# Patient Record
Sex: FEMALE | Race: WHITE | NOT HISPANIC OR LATINO | URBAN - METROPOLITAN AREA
[De-identification: names, ages, dates, MRNs, and addresses within clinical notes are randomized per-mention and may not be internally consistent; named-entity substitution may affect disease eponyms.]

---

## 2020-11-03 ENCOUNTER — OFFICE VISIT (OUTPATIENT)
Dept: URBAN - METROPOLITAN AREA CLINIC 33 | Facility: CLINIC | Age: 75
End: 2020-11-03

## 2020-11-03 VITALS
DIASTOLIC BLOOD PRESSURE: 70 MMHG | HEIGHT: 65 IN | BODY MASS INDEX: 23.49 KG/M2 | TEMPERATURE: 95.9 F | SYSTOLIC BLOOD PRESSURE: 110 MMHG | WEIGHT: 141 LBS

## 2020-11-03 PROBLEM — 428283002 HISTORY OF POLYP OF COLON (SITUATION): Status: ACTIVE | Noted: 2017-10-10

## 2020-11-03 RX ORDER — BACLOFEN 20 MG/1
1 TABLET TABLET ORAL
Status: ACTIVE | COMMUNITY

## 2020-11-03 RX ORDER — MULTIVITAMIN
1 TABLET TABLET ORAL ONCE A DAY
Qty: 30 | Status: ACTIVE | COMMUNITY

## 2020-11-03 RX ORDER — MEMANTINE HYDROCHLORIDE 10 MG/1
1 TABLET TABLET ORAL TWICE A DAY
Qty: 60 | Status: ON HOLD | COMMUNITY

## 2020-11-03 RX ORDER — PAROXETINE HYDROCHLORIDE 30 MG/1
1 TABLET IN THE MORNING TABLET, FILM COATED ORAL ONCE A DAY
Status: ACTIVE | COMMUNITY

## 2020-11-03 RX ORDER — CHOLECALCIFEROL (VITAMIN D3) 10(400)/ML
2 ML DROPS ORAL ONCE A DAY
Status: DISCONTINUED | COMMUNITY

## 2020-11-03 RX ORDER — ASPIRIN 81 MG/1
1 TABLET TABLET, CHEWABLE ORAL ONCE A DAY
COMMUNITY

## 2020-11-03 RX ORDER — KETOTIFEN FUMARATE 0.25 MG/ML
1 DROP INTO AFFECTED EYE SOLUTION OPHTHALMIC
Status: ACTIVE | COMMUNITY

## 2020-11-03 RX ORDER — DEXTROMETHORPHAN POLISTIREX 30 MG/5 ML
2 CAPSULES SUSPENSION, EXTENDED RELEASE 12 HR ORAL ONCE A DAY
Status: ACTIVE | COMMUNITY

## 2020-11-03 RX ORDER — ATORVASTATIN CALCIUM 20 MG/1
1 TABLET TABLET, FILM COATED ORAL ONCE A DAY
Status: ACTIVE | COMMUNITY

## 2020-11-03 RX ORDER — TIZANIDINE 4 MG/1
1 TABLET AS NEEDED TABLET ORAL THREE TIMES A DAY
COMMUNITY

## 2020-11-03 RX ORDER — UBIDECARENONE 60 MG
1 CAPSULE WITH A MEAL CAPSULE ORAL ONCE A DAY
Status: ON HOLD | COMMUNITY

## 2020-11-03 RX ORDER — CARBIDOPA AND LEVODOPA 25; 100 MG/1; MG/1
1 TABLET TABLET ORAL THREE TIMES A DAY
Status: ON HOLD | COMMUNITY

## 2020-11-03 RX ORDER — CLONAZEPAM 1 MG/1
1 TABLET TABLET ORAL ONCE A DAY
COMMUNITY

## 2020-11-03 RX ORDER — SODIUM SULFATE, POTASSIUM SULFATE, MAGNESIUM SULFATE 17.5; 3.13; 1.6 G/ML; G/ML; G/ML
AS DIRECTED SOLUTION, CONCENTRATE ORAL
Qty: 1 | Refills: 0 | OUTPATIENT
Start: 2020-11-03

## 2020-11-03 RX ORDER — AVENA SATIVA FLOWERING TOP, MATRICARIA RECUTITA, PASSIFLORA INCARNATA FLOWERING, TRIBASIC CALCIUM PHOSPHATE, DIBASIC POTASSIUM PHOSPHATE, POTASSIUM SULFATE, MAGNESIUM PHOSPHATE, DIBASIC, SODIUM CHLORIDE, SODIUM SULFATE, ZINC VALERATE DIHYDRATE, ARABICA COFFEE BEAN, STRYCHNOS NUX-VOMICA SEED, AND SULFUR 2; 2; 2; 2; 6; 6; 6; 6; 6; 6; 12; 12; 12 [HP_X]/1; [HP_X]/1; [HP_X]/1; [HP_X]/1; [HP_X]/1; [HP_X]/1; [HP_X]/1; [HP_X]/1; [HP_X]/1; [HP_X]/1; [HP_X]/1; [HP_X]/1; [HP_X]/1
TABLET ORAL
COMMUNITY

## 2020-11-03 NOTE — HPI-MIGRATED HPI
;     Surveillance Colonoscopy : Patient is a 75-year-old  female who presents today for a surveillance colonoscopy. Patient's last colonoscopy performed on 11/29/2017 with Dr. Awad revealed diverticulosis, hemorrhoids, hyperplastic polyp x1, tubular adenomas. Admits a fhx of rectal cancer. Patient denies a family hx of polyps and gastric/esophageal cancer/polyps. Patient currently admits 2 bowel movements a day as long as she takes Calm, her laxative. Stools are typically loose. Patient denies melena, blood, or mucus in stool, heartburn, nausea, vomiting, diarrhea, or constipation. ;

## 2021-01-21 ENCOUNTER — TELEPHONE ENCOUNTER (OUTPATIENT)
Dept: URBAN - METROPOLITAN AREA CLINIC 35 | Facility: CLINIC | Age: 76
End: 2021-01-21

## 2021-01-21 RX ORDER — SODIUM SULFATE, POTASSIUM SULFATE, MAGNESIUM SULFATE 17.5; 3.13; 1.6 G/ML; G/ML; G/ML
AS DIRECTED SOLUTION, CONCENTRATE ORAL
Qty: 1 | Refills: 0 | OUTPATIENT
Start: 2020-11-03

## 2021-01-29 ENCOUNTER — OFFICE VISIT (OUTPATIENT)
Dept: URBAN - METROPOLITAN AREA MEDICAL CENTER 10 | Facility: MEDICAL CENTER | Age: 76
End: 2021-01-29

## 2021-02-09 ENCOUNTER — OFFICE VISIT (OUTPATIENT)
Dept: URBAN - METROPOLITAN AREA CLINIC 33 | Facility: CLINIC | Age: 76
End: 2021-02-09

## 2021-02-09 RX ORDER — ATORVASTATIN CALCIUM 20 MG/1
1 TABLET TABLET, FILM COATED ORAL ONCE A DAY
Status: ACTIVE | COMMUNITY

## 2021-02-09 RX ORDER — TIZANIDINE 4 MG/1
1 TABLET AS NEEDED TABLET ORAL THREE TIMES A DAY
COMMUNITY

## 2021-02-09 RX ORDER — AVENA SATIVA FLOWERING TOP, MATRICARIA RECUTITA, PASSIFLORA INCARNATA FLOWERING, TRIBASIC CALCIUM PHOSPHATE, DIBASIC POTASSIUM PHOSPHATE, POTASSIUM SULFATE, MAGNESIUM PHOSPHATE, DIBASIC, SODIUM CHLORIDE, SODIUM SULFATE, ZINC VALERATE DIHYDRATE, ARABICA COFFEE BEAN, STRYCHNOS NUX-VOMICA SEED, AND SULFUR 2; 2; 2; 2; 6; 6; 6; 6; 6; 6; 12; 12; 12 [HP_X]/1; [HP_X]/1; [HP_X]/1; [HP_X]/1; [HP_X]/1; [HP_X]/1; [HP_X]/1; [HP_X]/1; [HP_X]/1; [HP_X]/1; [HP_X]/1; [HP_X]/1; [HP_X]/1
TABLET ORAL
COMMUNITY

## 2021-02-09 RX ORDER — BACLOFEN 20 MG/1
1 TABLET TABLET ORAL
Status: ACTIVE | COMMUNITY

## 2021-02-09 RX ORDER — DEXTROMETHORPHAN POLISTIREX 30 MG/5 ML
2 CAPSULES SUSPENSION, EXTENDED RELEASE 12 HR ORAL ONCE A DAY
Status: ACTIVE | COMMUNITY

## 2021-02-09 RX ORDER — PAROXETINE HYDROCHLORIDE 30 MG/1
1 TABLET IN THE MORNING TABLET, FILM COATED ORAL ONCE A DAY
Status: ACTIVE | COMMUNITY

## 2021-02-09 RX ORDER — MULTIVITAMIN
1 TABLET TABLET ORAL ONCE A DAY
Qty: 30 | Status: ACTIVE | COMMUNITY

## 2021-02-09 RX ORDER — CLONAZEPAM 1 MG/1
1 TABLET TABLET ORAL ONCE A DAY
COMMUNITY

## 2021-02-09 RX ORDER — CARBIDOPA AND LEVODOPA 25; 100 MG/1; MG/1
1 TABLET TABLET ORAL THREE TIMES A DAY
Status: ON HOLD | COMMUNITY

## 2021-02-09 RX ORDER — ASPIRIN 81 MG/1
1 TABLET TABLET, CHEWABLE ORAL ONCE A DAY
COMMUNITY

## 2021-02-09 RX ORDER — SODIUM SULFATE, POTASSIUM SULFATE, MAGNESIUM SULFATE 17.5; 3.13; 1.6 G/ML; G/ML; G/ML
AS DIRECTED SOLUTION, CONCENTRATE ORAL
Qty: 1 | Refills: 0 | Status: ACTIVE | COMMUNITY
Start: 2020-11-03

## 2021-02-09 RX ORDER — UBIDECARENONE 60 MG
1 CAPSULE WITH A MEAL CAPSULE ORAL ONCE A DAY
Status: ON HOLD | COMMUNITY

## 2021-02-09 RX ORDER — MEMANTINE HYDROCHLORIDE 10 MG/1
1 TABLET TABLET ORAL TWICE A DAY
Qty: 60 | Status: ON HOLD | COMMUNITY

## 2021-02-09 RX ORDER — KETOTIFEN FUMARATE 0.25 MG/ML
1 DROP INTO AFFECTED EYE SOLUTION OPHTHALMIC
Status: ACTIVE | COMMUNITY

## 2021-02-09 NOTE — HPI-MIGRATED HPI
;     Surveillance Colonoscopy : Patient is a 75-year-old  female who presents today to follow up s/p colonoscopy. Colonoscopy performed on 01/29/2021. Patient denies any complications after the procedure. Patient has __ bowel movements ____. Stools are _____. Patient denies abdominal pain, melena, mucus, or blood in stool.   Last visit (11/03/200)              Patient is a 75-year-old  female who presents today for a surveillance colonoscopy. Patient's last colonoscopy performed on 11/29/2017 with Dr. Awad revealed diverticulosis, hemorrhoids, hyperplastic polyp x1, tubular adenomas. Admits a fhx of rectal cancer. Patient denies a family hx of polyps and gastric/esophageal cancer/polyps. Patient currently admits 2 bowel movements a day as long as she takes Calm, her laxative. Stools are typically loose. Patient denies melena, blood, or mucus in stool, heartburn, nausea, vomiting, diarrhea, or constipation. ;

## 2021-02-12 ENCOUNTER — TELEPHONE ENCOUNTER (OUTPATIENT)
Dept: URBAN - METROPOLITAN AREA CLINIC 35 | Facility: CLINIC | Age: 76
End: 2021-02-12

## 2021-02-15 ENCOUNTER — TELEPHONE ENCOUNTER (OUTPATIENT)
Dept: URBAN - METROPOLITAN AREA CLINIC 35 | Facility: CLINIC | Age: 76
End: 2021-02-15

## 2021-02-16 ENCOUNTER — OFFICE VISIT (OUTPATIENT)
Dept: URBAN - METROPOLITAN AREA CLINIC 33 | Facility: CLINIC | Age: 76
End: 2021-02-16

## 2021-02-16 VITALS
SYSTOLIC BLOOD PRESSURE: 118 MMHG | OXYGEN SATURATION: 99 % | HEIGHT: 65 IN | WEIGHT: 140 LBS | DIASTOLIC BLOOD PRESSURE: 70 MMHG | BODY MASS INDEX: 23.32 KG/M2 | HEART RATE: 89 BPM

## 2021-02-16 PROBLEM — 398050005 DIVERTICULAR DISEASE OF COLON: Status: ACTIVE | Noted: 2017-12-19

## 2021-02-16 RX ORDER — ATORVASTATIN CALCIUM 20 MG/1
1 TABLET TABLET, FILM COATED ORAL ONCE A DAY
Status: ACTIVE | COMMUNITY

## 2021-02-16 RX ORDER — MEMANTINE HYDROCHLORIDE 10 MG/1
1 TABLET TABLET ORAL TWICE A DAY
Qty: 60 | COMMUNITY

## 2021-02-16 RX ORDER — PAROXETINE HYDROCHLORIDE 30 MG/1
1 TABLET IN THE MORNING TABLET, FILM COATED ORAL ONCE A DAY
Status: ACTIVE | COMMUNITY

## 2021-02-16 RX ORDER — UBIDECARENONE 60 MG
1 CAPSULE WITH A MEAL CAPSULE ORAL ONCE A DAY
COMMUNITY

## 2021-02-16 RX ORDER — DEXTROMETHORPHAN POLISTIREX 30 MG/5 ML
2 CAPSULES SUSPENSION, EXTENDED RELEASE 12 HR ORAL ONCE A DAY
Status: ACTIVE | COMMUNITY

## 2021-02-16 RX ORDER — AVENA SATIVA FLOWERING TOP, MATRICARIA RECUTITA, PASSIFLORA INCARNATA FLOWERING, TRIBASIC CALCIUM PHOSPHATE, DIBASIC POTASSIUM PHOSPHATE, POTASSIUM SULFATE, MAGNESIUM PHOSPHATE, DIBASIC, SODIUM CHLORIDE, SODIUM SULFATE, ZINC VALERATE DIHYDRATE, ARABICA COFFEE BEAN, STRYCHNOS NUX-VOMICA SEED, AND SULFUR 2; 2; 2; 2; 6; 6; 6; 6; 6; 6; 12; 12; 12 [HP_X]/1; [HP_X]/1; [HP_X]/1; [HP_X]/1; [HP_X]/1; [HP_X]/1; [HP_X]/1; [HP_X]/1; [HP_X]/1; [HP_X]/1; [HP_X]/1; [HP_X]/1; [HP_X]/1
TABLET ORAL
COMMUNITY

## 2021-02-16 RX ORDER — CARBIDOPA AND LEVODOPA 25; 100 MG/1; MG/1
1 TABLET TABLET ORAL THREE TIMES A DAY
COMMUNITY

## 2021-02-16 RX ORDER — SODIUM SULFATE, POTASSIUM SULFATE, MAGNESIUM SULFATE 17.5; 3.13; 1.6 G/ML; G/ML; G/ML
AS DIRECTED SOLUTION, CONCENTRATE ORAL
Qty: 1 | Refills: 0 | Status: ON HOLD | COMMUNITY
Start: 2020-11-03

## 2021-02-16 RX ORDER — CLONAZEPAM 1 MG/1
1 TABLET TABLET ORAL ONCE A DAY
COMMUNITY

## 2021-02-16 RX ORDER — TIZANIDINE 4 MG/1
1 TABLET AS NEEDED TABLET ORAL THREE TIMES A DAY
COMMUNITY

## 2021-02-16 RX ORDER — BACLOFEN 20 MG/1
1 TABLET TABLET ORAL
Status: ACTIVE | COMMUNITY

## 2021-02-16 RX ORDER — MULTIVITAMIN
1 TABLET TABLET ORAL ONCE A DAY
Qty: 30 | Status: ACTIVE | COMMUNITY

## 2021-02-16 RX ORDER — ASPIRIN 81 MG/1
1 TABLET TABLET, CHEWABLE ORAL ONCE A DAY
COMMUNITY

## 2021-02-16 RX ORDER — KETOTIFEN FUMARATE 0.25 MG/ML
1 DROP INTO AFFECTED EYE SOLUTION OPHTHALMIC
Status: ACTIVE | COMMUNITY

## 2021-02-16 NOTE — HPI-MIGRATED HPI
;     Surveillance Colonoscopy : Patient is a 75-year-old  female who presents today to follow up s/p colonoscopy. Colonoscopy performed on 01/29/2021 by Dr Awad with results noted below.   Patient denies any complications after the procedure. Patient has 1 bowel movement a day. Stools are loose without melena, mucus, or blood in stools. Patient denies abdominal pain.    Last visit (11/03/200)              Patient is a 75-year-old  female who presents today for a surveillance colonoscopy. Patient's last colonoscopy performed on 11/29/2017 with Dr. Awad revealed diverticulosis, hemorrhoids, hyperplastic polyp x1, tubular adenomas. Admits a hx of rectal cancer. Patient denies a family hx of polyps and gastric/esophageal cancer/polyps. Patient currently admits 2 bowel movements a day as long as she takes Calm, her laxative. Stools are typically loose. Patient denies melena, blood, or mucus in stool, heartburn, nausea, vomiting, diarrhea, or constipation. ;